# Patient Record
(demographics unavailable — no encounter records)

---

## 2025-01-08 NOTE — PHYSICAL EXAM
[Chaperone Present] : A chaperone was present in the examining room during all aspects of the physical examination [[id number]] : A chaperone was present during the pelvic exam. [Examination Of The Breasts] : a normal appearance [No Masses] : no breast masses were palpable [Vulvar Atrophy] : vulvar atrophy [Labia Majora] : normal [Labia Minora] : normal [Atrophy] : atrophy [Normal] : normal [Uterine Adnexae] : normal [FreeTextEntry2] : ELIZABETH

## 2025-01-08 NOTE — HISTORY OF PRESENT ILLNESS
[Difficulty with [place]] : difficulty with intercourse [PGHxTotal] : 3 [FreeTextEntry1] : 33/29/26 [[facility] : [facility]